# Patient Record
Sex: MALE | Race: WHITE
[De-identification: names, ages, dates, MRNs, and addresses within clinical notes are randomized per-mention and may not be internally consistent; named-entity substitution may affect disease eponyms.]

---

## 2018-09-11 ENCOUNTER — HOSPITAL ENCOUNTER (EMERGENCY)
Dept: HOSPITAL 41 - JD.ED | Age: 28
Discharge: HOME | End: 2018-09-11
Payer: COMMERCIAL

## 2018-09-11 DIAGNOSIS — S52.221A: Primary | ICD-10-CM
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
## 2018-09-11 DIAGNOSIS — Z87.891: ICD-10-CM

## 2018-09-11 PROCEDURE — 73090 X-RAY EXAM OF FOREARM: CPT

## 2018-09-11 PROCEDURE — 99284 EMERGENCY DEPT VISIT MOD MDM: CPT

## 2018-09-11 PROCEDURE — 29125 APPL SHORT ARM SPLINT STATIC: CPT
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
## 2018-09-12 NOTE — CR
Right forearm:  Two views of the right forearm were obtained.

 

Comparison: No previous study.

 

Fracture is identified within the distal shaft of the ulna.  Mild 

displacement is seen by slightly greater than a cortical width.  Soft 

tissue swelling is noted.  No additional fracture or other abnormality

 is seen.

 

Impression:

1.  Slightly displaced distal ulnar shaft fracture.

2.  Soft tissue swelling.

 

Diagnostic code #3